# Patient Record
Sex: MALE | Race: WHITE | NOT HISPANIC OR LATINO | Employment: UNEMPLOYED | URBAN - NONMETROPOLITAN AREA
[De-identification: names, ages, dates, MRNs, and addresses within clinical notes are randomized per-mention and may not be internally consistent; named-entity substitution may affect disease eponyms.]

---

## 2017-01-05 ENCOUNTER — HOSPITAL ENCOUNTER (INPATIENT)
Facility: HOSPITAL | Age: 52
LOS: 4 days | Discharge: HOME | End: 2017-01-09
Attending: PSYCHIATRY & NEUROLOGY | Admitting: PSYCHIATRY & NEUROLOGY

## 2017-01-05 DIAGNOSIS — F33.2 SEVERE EPISODE OF RECURRENT MAJOR DEPRESSIVE DISORDER, WITHOUT PSYCHOTIC FEATURES (HCC): Primary | ICD-10-CM

## 2017-01-05 PROBLEM — F32.9 MDD (MAJOR DEPRESSIVE DISORDER): Status: ACTIVE | Noted: 2017-01-05

## 2017-01-05 LAB — PHENYTOIN SERPL-MCNC: 4.9 MCG/ML (ref 10–20)

## 2017-01-05 PROCEDURE — 80185 ASSAY OF PHENYTOIN TOTAL: CPT | Performed by: PSYCHIATRY & NEUROLOGY

## 2017-01-05 RX ORDER — BENZONATATE 100 MG/1
100 CAPSULE ORAL 3 TIMES DAILY PRN
Status: DISCONTINUED | OUTPATIENT
Start: 2017-01-05 | End: 2017-01-09 | Stop reason: HOSPADM

## 2017-01-05 RX ORDER — ACETAMINOPHEN 325 MG/1
650 TABLET ORAL EVERY 6 HOURS PRN
Status: DISCONTINUED | OUTPATIENT
Start: 2017-01-05 | End: 2017-01-09 | Stop reason: HOSPADM

## 2017-01-05 RX ORDER — MAGNESIUM HYDROXIDE/ALUMINUM HYDROXICE/SIMETHICONE 120; 1200; 1200 MG/30ML; MG/30ML; MG/30ML
30 SUSPENSION ORAL EVERY 6 HOURS PRN
Status: DISCONTINUED | OUTPATIENT
Start: 2017-01-05 | End: 2017-01-09 | Stop reason: HOSPADM

## 2017-01-05 RX ORDER — HYDROXYZINE 50 MG/1
50 TABLET, FILM COATED ORAL EVERY 6 HOURS PRN
Status: DISCONTINUED | OUTPATIENT
Start: 2017-01-05 | End: 2017-01-09 | Stop reason: HOSPADM

## 2017-01-05 RX ORDER — ECHINACEA PURPUREA EXTRACT 125 MG
2 TABLET ORAL AS NEEDED
Status: DISCONTINUED | OUTPATIENT
Start: 2017-01-05 | End: 2017-01-09 | Stop reason: HOSPADM

## 2017-01-05 RX ORDER — BENZTROPINE MESYLATE 1 MG/1
2 TABLET ORAL AS NEEDED
Status: DISCONTINUED | OUTPATIENT
Start: 2017-01-05 | End: 2017-01-09 | Stop reason: HOSPADM

## 2017-01-05 RX ORDER — TRAZODONE HYDROCHLORIDE 50 MG/1
100 TABLET ORAL NIGHTLY PRN
Status: DISCONTINUED | OUTPATIENT
Start: 2017-01-05 | End: 2017-01-09 | Stop reason: HOSPADM

## 2017-01-05 RX ORDER — BENZTROPINE MESYLATE 1 MG/ML
1 INJECTION INTRAMUSCULAR; INTRAVENOUS AS NEEDED
Status: DISCONTINUED | OUTPATIENT
Start: 2017-01-05 | End: 2017-01-09 | Stop reason: HOSPADM

## 2017-01-05 RX ORDER — IBUPROFEN 400 MG/1
400 TABLET ORAL EVERY 6 HOURS PRN
Status: DISCONTINUED | OUTPATIENT
Start: 2017-01-05 | End: 2017-01-09 | Stop reason: HOSPADM

## 2017-01-05 RX ORDER — NICOTINE 21 MG/24HR
1 PATCH, TRANSDERMAL 24 HOURS TRANSDERMAL
Status: DISCONTINUED | OUTPATIENT
Start: 2017-01-05 | End: 2017-01-09 | Stop reason: HOSPADM

## 2017-01-05 RX ADMIN — NICOTINE 1 PATCH: 21 PATCH TRANSDERMAL at 21:03

## 2017-01-05 NOTE — IP AVS SNAPSHOT
AFTER VISIT SUMMARY             [facility]           About your hospitalization     You were admitted on:  January 5, 2017 You last received care in the:  [facility] ADULT PSYCHIATRIC 2       Procedures & Surgeries         Medications    If you or your caregiver advised us that you are currently taking a medication and that medication is marked below as “Resume”, this simply indicates that we have reviewed those medications to make sure our new therapy recommendations do not interfere.  If you have concerns about medications other than those new ones which we are prescribing today, please consult the physician who prescribed them (or your primary physician).  Our review of your home medications is not meant to indicate that we are directing their use.             Your Medications      START taking these medications     busPIRone 10 MG tablet   Take 1 tablet by mouth 3 (Three) Times a Day.   Last time this was given:  1/9/2017  8:05 AM   Commonly known as:  BUSPAR   Next Dose Due:  01/09/2017 @ 4PM           hydrOXYzine 50 MG tablet   Take 1 tablet by mouth Every 6 (Six) Hours As Needed for anxiety.   Last time this was given:  1/8/2017  4:55 PM   Commonly known as:  ATARAX   Next Dose Due:  NEXT DOSE MAY BE GIVEN WHENEVER NEEDED, HAS BEEN 6 HOURS SINCE LAST DOSE.    Notes to Patient:  MAY TAKE A DOSE EVERY 6 HOURS ONLY IF NEEDED.           phenytoin 100 MG ER capsule   Take 1 capsule by mouth 3 (Three) Times a Day.   Commonly known as:  DILANTIN   Next Dose Due:  01/09/2017 @ 2PM           sertraline 50 MG tablet   Take 1 tablet by mouth Every Night.   Last time this was given:  1/8/2017  8:51 PM   Commonly known as:  ZOLOFT   Next Dose Due:  01/09/2017 @ 9PM, BEDTIME                Where to Get Your Medications      These [facility] [place] [address]    Hours:  8AM-6PM Mon-Fri Phone:  [phone]     busPIRone 10 MG tablet    hydrOXYzine 50  MG tablet    phenytoin 100 MG ER capsule    sertraline 50 MG tablet                  Your Medications      Your Medication List           Morning Noon Evening Bedtime As Needed    busPIRone 10 MG tablet   Take 1 tablet by mouth 3 (Three) Times a Day.   Commonly known as:  BUSPAR            0900           4PM       9PM           hydrOXYzine 50 MG tablet   Take 1 tablet by mouth Every 6 (Six) Hours As Needed for anxiety.   Commonly known as:  ATARAX   Notes to Patient:  MAY TAKE A DOSE EVERY 6 HOURS ONLY IF NEEDED.                                phenytoin 100 MG ER capsule   Take 1 capsule by mouth 3 (Three) Times a Day.   Commonly known as:  DILANTIN            0600           2PM       10PM           sertraline 50 MG tablet   Take 1 tablet by mouth Every Night.   Commonly known as:  ZOLOFT                                            Instructions for After Discharge        Activity Instructions     Activity as Tolerated                 Diet Instructions     Advance Diet As Tolerated                 Discharge References/Attachments     MAJOR DEPRESSIVE DISORDER (ENGLISH)    BUSPIRONE TABLETS (ENGLISH)    HYDROXYZINE CAPSULES OR TABLETS (ENGLISH)    PHENYTOIN CAPSULES AND EXTENDED-RELEASE CAPSULES (ENGLISH)    SERTRALINE TABLETS (ENGLISH)       Follow-ups for After Discharge        Referrals and Follow-ups to [facility]     Patient has declined outpatient follow up to be scheduled locally. Patient will be transported to [facility] via [facility] and has been encouraged by Dr. Gutierrez and Therapist to seek outpatient treatment upon arriving in [place]. Please follow these recommendations to seek outpatient follow up once established in [place].            [facility] Signup     Our records indicate that you have declined [facility] signup. If you would like to sign up for [employer], please email [email or web address] or call [phone] to obtain an activation code.         Summary of Your  Hospitalization        Reason for Hospitalization     Your primary diagnosis was:  Mood Problem      Care Providers     Provider Service Role Specialty    Peña Gutierrez MD Psychiatry Attending Provider Psychiatry      Your Allergies  Date Reviewed: 1/5/2017    No active allergies      Patient Belongings Returned     Document Return of Belongings Flowsheet     Were the patient bedside belongings sent home?   Yes   Belongings Retrieved from Security & Sent Home   Yes    Belongings Sent to Safe   --   Medications Retrieved from Pharmacy & Sent Home   -- (NO MEDS IN PHARMACY SAFE PER CLIENT REPORT AND STAFF DOCUMENTATION.)              More Information      Major Depressive Disorder  Major depressive disorder is a mental illness. It also may be called clinical depression or unipolar depression. Major depressive disorder usually causes feelings of sadness, hopelessness, or helplessness. Some people with this disorder do not feel particularly sad but lose interest in doing things they used to enjoy (anhedonia). Major depressive disorder also can cause physical symptoms. It can interfere with work, school, relationships, and other normal everyday activities. The disorder varies in severity but is longer lasting and more serious than the sadness we all feel from time to time in our lives.  Major depressive disorder often is triggered by stressful life events or major life changes. Examples of these triggers include divorce, loss of your job or home, a move, and the death of a family member or close friend. Sometimes this disorder occurs for no obvious reason at all. People who have family members with major depressive disorder or bipolar disorder are at higher risk for developing this disorder, with or without life stressors. Major depressive disorder can occur at any age. It may occur just once in your life (single episode major depressive disorder). It may occur multiple times (recurrent major depressive  disorder).  SYMPTOMS  People with major depressive disorder have either anhedonia or depressed mood on nearly a daily basis for at least 2 weeks or longer. Symptoms of depressed mood include:  · Feelings of sadness (blue or down in the dumps) or emptiness.  · Feelings of hopelessness or helplessness.  · Tearfulness or episodes of crying (may be observed by others).  · Irritability (children and adolescents).  In addition to depressed mood or anhedonia or both, people with this disorder have at least four of the following symptoms:  · Difficulty sleeping or sleeping too much.    · Significant change (increase or decrease) in appetite or weight.    · Lack of energy or motivation.  · Feelings of guilt and worthlessness.    · Difficulty concentrating, remembering, or making decisions.  · Unusually slow movement (psychomotor retardation) or restlessness (as observed by others).    · Recurrent wishes for death, recurrent thoughts of self-harm (suicide), or a suicide attempt.  People with major depressive disorder commonly have persistent negative thoughts about themselves, other people, and the world. People with severe major depressive disorder may experience distorted beliefs or perceptions about the world (psychotic delusions). They also may see or hear things that are not real (psychotic hallucinations).  DIAGNOSIS  Major depressive disorder is diagnosed through an assessment by your health care provider. Your health care provider will ask about aspects of your daily life, such as mood, sleep, and appetite, to see if you have the diagnostic symptoms of major depressive disorder. Your health care provider may ask about your medical history and use of alcohol or drugs, including prescription medicines. Your health care provider also may do a physical exam and blood work. This is because certain medical conditions and the use of certain substances can cause major depressive disorder-like symptoms (secondary depression).  Your health care provider also may refer you to a mental health specialist for further evaluation and treatment.  TREATMENT  It is important to recognize the symptoms of major depressive disorder and seek treatment. The following treatments can be prescribed for this disorder:    · Medicine. Antidepressant medicines usually are prescribed. Antidepressant medicines are thought to correct chemical imbalances in the brain that are commonly associated with major depressive disorder. Other types of medicine may be added if the symptoms do not respond to antidepressant medicines alone or if psychotic delusions or hallucinations occur.  · Talk therapy. Talk therapy can be helpful in treating major depressive disorder by providing support, education, and guidance. Certain types of talk therapy also can help with negative thinking (cognitive behavioral therapy) and with relationship issues that trigger this disorder (interpersonal therapy).  A mental health specialist can help determine which treatment is best for you. Most people with major depressive disorder do well with a combination of medicine and talk therapy. Treatments involving electrical stimulation of the brain can be used in situations with extremely severe symptoms or when medicine and talk therapy do not work over time. These treatments include electroconvulsive therapy, transcranial magnetic stimulation, and vagal nerve stimulation.     This information is not intended to replace advice given to you by your health care provider. Make sure you discuss any questions you have with your health care provider.     Document Released: 04/14/2014 Document Revised: 01/08/2016 Document Reviewed: 04/14/2014  [employer] Interactive Patient Education ©2016 [employer]          Buspirone tablets  What is this medicine?  BUSPIRONE (byoo RIGO bright) is used to treat anxiety disorders.  This medicine may be used for other purposes; ask your health care provider or pharmacist if  you have questions.  What should I tell my health care provider before I take this medicine?  They need to know if you have any of these conditions:  -kidney or liver disease  -an unusual or allergic reaction to buspirone, other medicines, foods, dyes, or preservatives  -pregnant or trying to get pregnant  -breast-feeding  How should I use this medicine?  Take this medicine by mouth with a glass of water. Follow the directions on the prescription label. You may take this medicine with or without food. To ensure that this medicine always works the same way for you, you should take it either always with or always without food. Take your doses at regular intervals. Do not take your medicine more often than directed. Do not stop taking except on the advice of your doctor or health care professional.  Talk to your pediatrician regarding the use of this medicine in children. Special care may be needed.  Overdosage: If you think you have taken too much of this medicine contact a poison control center or emergency room at once.  NOTE: This medicine is only for you. Do not share this medicine with others.  What if I miss a dose?  If you miss a dose, take it as soon as you can. If it is almost time for your next dose, take only that dose. Do not take double or extra doses.  What may interact with this medicine?  Do not take this medicine with any of the following medications:  -linezolid  -MAOIs like Carbex, Eldepryl, Marplan, Nardil, and Parnate  -methylene blue  -procarbazine  This medicine may also interact with the following medications:  -diazepam  -digoxin  -diltiazem  -erythromycin  -grapefruit juice  -haloperidol  -medicines for mental depression or mood problems  -medicines for seizures like carbamazepine, phenobarbital and phenytoin  -nefazodone  -other medications for anxiety  -rifampin  -ritonavir  -some antifungal medicines like itraconazole, ketoconazole, and voriconazole  -verapamil  -warfarin  This list may  not describe all possible interactions. Give your health care provider a list of all the medicines, herbs, non-prescription drugs, or dietary supplements you use. Also tell them if you smoke, drink alcohol, or use illegal drugs. Some items may interact with your medicine.  What should I watch for while using this medicine?  Visit your doctor or health care professional for regular checks on your progress. It may take 1 to 2 weeks before your anxiety gets better.  You may get drowsy or dizzy. Do not drive, use machinery, or do anything that needs mental alertness until you know how this drug affects you. Do not stand or sit up quickly, especially if you are an older patient. This reduces the risk of dizzy or fainting spells. Alcohol can make you more drowsy and dizzy. Avoid alcoholic drinks.  What side effects may I notice from receiving this medicine?  Side effects that you should report to your doctor or health care professional as soon as possible:  -blurred vision or other vision changes  -chest pain  -confusion  -difficulty breathing  -feelings of hostility or anger  -muscle aches and pains  -numbness or tingling in hands or feet  -ringing in the ears  -skin rash and itching  -vomiting  -weakness  Side effects that usually do not require medical attention (report to your doctor or health care professional if they continue or are bothersome):  -disturbed dreams, nightmares  -headache  -nausea  -restlessness or nervousness  -sore throat and nasal congestion  -stomach upset  This list may not describe all possible side effects. Call your doctor for medical advice about side effects. You may report side effects to FDA at 8-695-FDA-5910.  Where should I keep my medicine?  Keep out of the reach of children.  Store at room temperature below 30 degrees C (86 degrees F). Protect from light. Keep container tightly closed. Throw away any unused medicine after the expiration date.  NOTE: This sheet is a summary. It may not  cover all possible information. If you have questions about this medicine, talk to your doctor, pharmacist, or health care provider.     © 2016, Elsevier/Gold Standard. (2011-07-28 18:06:11)          Hydroxyzine capsules or tablets  What is this medicine?  HYDROXYZINE (lisa DROX i zeen) is an antihistamine. This medicine is used to treat allergy symptoms. It is also used to treat anxiety and tension. This medicine can be used with other medicines to induce sleep before surgery.  This medicine may be used for other purposes; ask your health care provider or pharmacist if you have questions.  What should I tell my health care provider before I take this medicine?  They need to know if you have any of these conditions:  -any chronic illness  -difficulty passing urine  -glaucoma  -heart disease  -kidney disease  -liver disease  -lung disease  -an unusual or allergic reaction to hydroxyzine, cetirizine, other medicines, foods, dyes, or preservatives  -pregnant or trying to get pregnant  -breast-feeding  How should I use this medicine?  Take this medicine by mouth with a full glass of water. Follow the directions on the prescription label. You may take this medicine with food or on an empty stomach. Take your medicine at regular intervals. Do not take your medicine more often than directed.  Talk to your pediatrician regarding the use of this medicine in children. Special care may be needed. While this drug may be prescribed for children as young as 6 years of age for selected conditions, precautions do apply.  Patients over 65 years old may have a stronger reaction and need a smaller dose.  Overdosage: If you think you have taken too much of this medicine contact a poison control center or emergency room at once.  NOTE: This medicine is only for you. Do not share this medicine with others.  What if I miss a dose?  If you miss a dose, take it as soon as you can. If it is almost time for your next dose, take only that dose.  Do not take double or extra doses.  What may interact with this medicine?  -alcohol  -barbiturate medicines for sleep or seizures  -medicines for colds, allergies  -medicines for depression, anxiety, or emotional disturbances  -medicines for pain  -medicines for sleep  -muscle relaxants  This list may not describe all possible interactions. Give your health care provider a list of all the medicines, herbs, non-prescription drugs, or dietary supplements you use. Also tell them if you smoke, drink alcohol, or use illegal drugs. Some items may interact with your medicine.  What should I watch for while using this medicine?  Tell your doctor or health care professional if your symptoms do not improve.  You may get drowsy or dizzy. Do not drive, use machinery, or do anything that needs mental alertness until you know how this medicine affects you. Do not stand or sit up quickly, especially if you are an older patient. This reduces the risk of dizzy or fainting spells. Alcohol may interfere with the effect of this medicine. Avoid alcoholic drinks.  Your mouth may get dry. Chewing sugarless gum or sucking hard candy, and drinking plenty of water may help. Contact your doctor if the problem does not go away or is severe.  This medicine may cause dry eyes and blurred vision. If you wear contact lenses you may feel some discomfort. Lubricating drops may help. See your eye doctor if the problem does not go away or is severe.  If you are receiving skin tests for allergies, tell your doctor you are using this medicine.  What side effects may I notice from receiving this medicine?  Side effects that you should report to your doctor or health care professional as soon as possible:  -fast or irregular heartbeat  -difficulty passing urine  -seizures  -slurred speech or confusion  -tremor  Side effects that usually do not require medical attention (report to your doctor or health care professional if they continue or are  bothersome):  -constipation  -drowsiness  -fatigue  -headache  -stomach upset  This list may not describe all possible side effects. Call your doctor for medical advice about side effects. You may report side effects to FDA at 6-544-FDA-5123.  Where should I keep my medicine?  Keep out of the reach of children.  Store at room temperature between 15 and 30 degrees C (59 and 86 degrees F). Keep container tightly closed. Throw away any unused medicine after the expiration date.  NOTE: This sheet is a summary. It may not cover all possible information. If you have questions about this medicine, talk to your doctor, pharmacist, or health care provider.     © 2016, Elsevier/Gold Standard. (2009-05-01 14:50:59)          Phenytoin capsules and extended-release capsules  What is this medicine?  PHENYTOIN (FEN i toyn) is used to control seizures in certain types of epilepsy. It is also used to prevent seizures during or after surgery.  This medicine may be used for other purposes; ask your health care provider or pharmacist if you have questions.  What should I tell my health care provider before I take this medicine?  They need to know if you have any of these conditions:  -an alcohol abuse problem  - ancestry  -blood disorders or disease  -diabetes  -heart problems  -kidney disease  -liver disease  -porphyria  -receiving radiation therapy  -suicidal thoughts, plans, or attempt; a previous suicide attempt by you or a family member  -thyroid disease  -an unusual or allergic reaction to phenytoin, other medicines, foods, dyes, or preservatives  -pregnant or trying to get pregnant  -breast-feeding  How should I use this medicine?  Take this medicine by mouth with a glass of water. Follow the directions on the prescription label. If you are taking extended-release capsules, swallow them whole. Do not crush or chew. Take this medicine with food if it upsets your stomach. It may be best to take your medicine consistently with  or without food. Take your doses at regular intervals. Do not take your medicine more often than directed. Do not stop taking this medicine suddenly. This increases the risk of seizures. Your doctor will tell you how much medicine to take. If your doctor wants you to stop the medicine, the dose will be slowly lowered over time to avoid any side effects.  Talk to your pediatrician regarding the use of this medicine in children. Special care may be needed.  Overdosage: If you think you have taken too much of this medicine contact a poison control center or emergency room at once.  NOTE: This medicine is only for you. Do not share this medicine with others.  What if I miss a dose?  If you miss a dose, take it as soon as you can. If it is almost time for your next dose, take only that dose. Do not take double or extra doses.  What may interact with this medicine?  Do not take this medicine with any of the following medications:  -delavirdine  -ibrutinib  -ranolazine  This medicine may also interact with the following medications:  -albendazole  -alcohol  -antiviral medicines for HIV or AIDS  -aspirin and aspirin-like medicines  -certain medicines for blood pressure like nifedipine, nimodipine, and verapamil  -certain medicines for cancer  -certain medicines for cholesterol like atorvastatin, simvastatin, and fluvastatin  -certain medicines for depression, anxiety, or psychotic disturbances  -certain medicines for fungal infections like ketoconazole and itraconazole  -certain medicines for irregular heart beat like amiodarone and quinidine  -certain medicines for seizures like carbamazepine, phenobarbital, and topiramate  -certain medicines for stomach problems like cimetidine and omeprazole  -chloramphenicol  -cyclosporine  -diazoxide  -digoxin  -disulfiram  -doxycycline  -female hormones, like estrogens and birth control pills  -furosemide  -halothane  -isoniazid  -medicines that relax muscles for  surgery  -methylphenidate  -narcotic medicines for pain  -phenothiazines like chlorpromazine, mesoridazine, prochlorperazine, thioridazine  -praziquantel  -reserpine  -rifampin  -Roya's Wort  -steroid medicines like prednisone or cortisone  -sulfonamides like sulfamethoxazole or sulfasalazine  -supplements like folic acid or vitamin D  -theophylline  -ticlopidine  -tolbutamide  -warfarin  This list may not describe all possible interactions. Give your health care provider a list of all the medicines, herbs, non-prescription drugs, or dietary supplements you use. Also tell them if you smoke, drink alcohol, or use illegal drugs. Some items may interact with your medicine.  What should I watch for while using this medicine?  Visit your doctor or health care professional for regular checks on your progress. This medicine needs careful monitoring. Your doctor or health care professional may schedule regular blood tests.  Wear a medical ID bracelet or chain, and carry a card that describes your disease and details of your medicine and dosage times.  Do not change brands or dosage forms of this medicine without discussing the change with your doctor or health care professional.  You may get drowsy or dizzy. Do not drive, use machinery, or do anything that needs mental alertness until you know how this medicine affects you. Do not stand or sit up quickly, especially if you are an older patient. This reduces the risk of dizzy or fainting spells. Alcohol may interfere with the effect of this medicine. Avoid alcoholic drinks.  Birth control pills may not work properly while you are taking this medicine. Talk to your doctor about using an extra method of birth control.  This medicine can cause unusual growth of gum tissues. Visit your dentist regularly. Problems can arise if you need dental work, and in the day to day care of your teeth. Try to avoid damage to your teeth and gums when you brush or floss your teeth.  Do not  take antacids at the same time as this medicine. If you get an upset stomach and want to take an antacid or medicine for diarrhea, make sure there is an interval of 2 to 3 hours before or after you took your phenytoin.  The use of this medicine may increase the chance of suicidal thoughts or actions. Pay special attention to how you are responding while on this medicine. Any worsening of mood, or thoughts of suicide or dying should be reported to your health care professional right away.  Women who become pregnant while using this medicine may enroll in the [place] Antiepileptic Drug Pregnancy Registry by calling [phone]. This registry collects information about the safety of antiepileptic drug use during pregnancy.  What side effects may I notice from receiving this medicine?  Side effects that you should report to your doctor or health care professional as soon as possible:  -allergic reactions like skin rash, itching or hives, swelling of the face, lips, or tongue  -breathing problems  -changes in vision  -chest pain or tightness  -confusion  -dark yellow or brown urine  -fast or irregular heartbeat  -fever, sore throat  -headache  -loss of seizure control  -poor control of body movements or difficulty walking  -redness, blistering, peeling or loosening of the skin, including inside the mouth  -unusual bleeding or bruising, pinpoint red spots on skin  -vomiting  -worsening of mood, thoughts or actions of suicide or dying  -yellowing of the eyes or skin  Side effects that usually do not require medical attention (report to your doctor or health care professional if they continue or are bothersome):  -constipation  -difficulty sleeping  -excessive hair growth on the face or body  -nausea  This list may not describe all possible side effects. Call your doctor for medical advice about side effects. You may report side effects to FDA at 1-903-FDA-4769.  Where should I keep my medicine?  Keep out of the  reach of children.  Store at room temperature between 15 and 30 degrees C (59 and 86 degrees F). Protect from light and moisture. Throw away any unused medicine after the expiration date.  NOTE: This sheet is a summary. It may not cover all possible information. If you have questions about this medicine, talk to your doctor, pharmacist, or health care provider.     © 2016, [employer]/Gold Standard. (2014-07-14 15:07:05)          Sertraline tablets  What is this medicine?  SERTRALINE (SER tra beny) is used to treat depression. It may also be used to treat obsessive compulsive disorder, panic disorder, post-trauma stress, premenstrual dysphoric disorder (PMDD) or social anxiety.  This medicine may be used for other purposes; ask your health care provider or pharmacist if you have questions.  What should I tell my health care provider before I take this medicine?  They need to know if you have any of these conditions:  -bipolar disorder or a family history of bipolar disorder  -diabetes  -glaucoma  -heart disease  -high blood pressure  -history of irregular heartbeat  -history of low levels of calcium, magnesium, or potassium in the blood  -if you often drink alcohol  -liver disease  -receiving electroconvulsive therapy  -seizures  -suicidal thoughts, plans, or attempt; a previous suicide attempt by you or a family member  -thyroid disease  -an unusual or allergic reaction to sertraline, other medicines, foods, dyes, or preservatives  -pregnant or trying to get pregnant  -breast-feeding  How should I use this medicine?  Take this medicine by mouth with a glass of water. Follow the directions on the prescription label. You can take it with or without food. Take your medicine at regular intervals. Do not take your medicine more often than directed. Do not stop taking this medicine suddenly except upon the advice of your doctor. Stopping this medicine too quickly may cause serious side effects or your condition may  worsen.  A special MedGuide will be given to you by the pharmacist with each prescription and refill. Be sure to read this information carefully each time.  Talk to your pediatrician regarding the use of this medicine in children. While this drug may be prescribed for children as young as 7 years for selected conditions, precautions do apply.  Overdosage: If you think you have taken too much of this medicine contact a poison control center or emergency room at once.  NOTE: This medicine is only for you. Do not share this medicine with others.  What if I miss a dose?  If you miss a dose, take it as soon as you can. If it is almost time for your next dose, take only that dose. Do not take double or extra doses.  What may interact with this medicine?  Do not take this medicine with any of the following medications:  -certain medicines for fungal infections like fluconazole, itraconazole, ketoconazole, posaconazole, voriconazole  -cisapride  -disulfiram  -dofetilide  -linezolid  -MAOIs like Carbex, Eldepryl, Marplan, Nardil, and Parnate  -metronidazole  -methylene blue (injected into a vein)  -pimozide  -thioridazine  -ziprasidone  This medicine may also interact with the following medications:  -alcohol  -aspirin and aspirin-like medicines  -certain medicines for depression, anxiety, or psychotic disturbances  -certain medicines for irregular heart beat like flecainide, propafenone  -certain medicines for migraine headaches like almotriptan, eletriptan, frovatriptan, naratriptan, rizatriptan, sumatriptan, zolmitriptan  -certain medicines for sleep  -certain medicines for seizures like carbamazepine, valproic acid, phenytoin  -certain medicines that treat or prevent blood clots like warfarin, enoxaparin, dalteparin  -cimetidine  -digoxin  -diuretics  -fentanyl  -furazolidone  -isoniazid  -lithium  -NSAIDs, medicines for pain and inflammation, like ibuprofen or naproxen  -other medicines that prolong the QT interval  (cause an abnormal heart rhythm)  -procarbazine  -rasagiline  -supplements like [place]'s wort, kava kava, valerian  -tolbutamide  -tramadol  -tryptophan  This list may not describe all possible interactions. Give your health care provider a list of all the medicines, herbs, non-prescription drugs, or dietary supplements you use. Also tell them if you smoke, drink alcohol, or use illegal drugs. Some items may interact with your medicine.  What should I watch for while using this medicine?  Tell your doctor if your symptoms do not get better or if they get worse. Visit your doctor or health care professional for regular checks on your progress. Because it may take several weeks to see the full effects of this medicine, it is important to continue your treatment as prescribed by your doctor.  Patients and their families should watch out for new or worsening thoughts of suicide or depression. Also watch out for sudden changes in feelings such as feeling anxious, agitated, panicky, irritable, hostile, aggressive, impulsive, severely restless, overly excited and hyperactive, or not being able to sleep. If this happens, especially at the beginning of treatment or after a change in dose, call your health care professional.  You may get drowsy or dizzy. Do not drive, use machinery, or do anything that needs mental alertness until you know how this medicine affects you. Do not stand or sit up quickly, especially if you are an older patient. This reduces the risk of dizzy or fainting spells. Alcohol may interfere with the effect of this medicine. Avoid alcoholic drinks.  Your mouth may get dry. Chewing sugarless gum or sucking hard candy, and drinking plenty of water may help. Contact your doctor if the problem does not go away or is severe.  What side effects may I notice from receiving this medicine?  Side effects that you should report to your doctor or health care professional as soon as possible:  -allergic reactions  like skin rash, itching or hives, swelling of the face, lips, or tongue  -black or bloody stools, blood in the urine or vomit  -fast, irregular heartbeat  -feeling faint or lightheaded, falls  -hallucination, loss of contact with reality  -seizures  -suicidal thoughts or other mood changes  -unusual bleeding or bruising  -unusually weak or tired  -vomiting  Side effects that usually do not require medical attention (report to your doctor or health care professional if they continue or are bothersome):  -change in appetite  -change in sex drive or performance  -diarrhea  -increased sweating  -indigestion, nausea  -tremors  This list may not describe all possible side effects. Call your doctor for medical advice about side effects. You may report side effects to FDA at 6-806-FDA-7927.  Where should I keep my medicine?  Keep out of the reach of children.  Store at room temperature between 15 and 30 degrees C (59 and 86 degrees F). Throw away any unused medicine after the expiration date.  NOTE: This sheet is a summary. It may not cover all possible information. If you have questions about this medicine, talk to your doctor, pharmacist, or health care provider.     © 2016, Elsevier/Gold Standard. (2014-07-15 12:57:35)            SYMPTOMS OF A STROKE    Call 911 or have someone take you to the Emergency Department if you have any of the following:    · Sudden numbness or weakness of your face, arm or leg especially on one side of the body  · Sudden confusion, diffiiculty speaking or trouble understanding   · Changes in your vision or loss of sight in one eye  · Sudden severe headache with no known cause  · sudden dizziness, trouble walking, loss of balance or coordination    It is important to seek emergency care right away if you have further stroke symptoms. If you get emergency help quickly, the powerful clot-dissolving medicines can reduce the disabilities caused by a stroke.     For more information:    American  Stroke Association  9-410-0-STROKE  www.strokeassociation.org           IF YOU SMOKE OR USE TOBACCO PLEASE READ THE FOLLOWING:    Why is smoking bad for me?  Smoking increases the risk of heart disease, lung disease, vascular disease, stroke, and cancer.     If you smoke, STOP!    If you would like more information on quitting smoking, please visit the [employer] website: [email or web address]   This link will provide additional resources including the QUIT line and the [employer] the [employer] support groups.     For more information:    American Cancer Society  [phone]    American Heart Association  1-994.610.9202               YOU ARE THE MOST IMPORTANT FACTOR IN YOUR RECOVERY.     Follow all instructions carefully.     I have reviewed my discharge instructions with my nurse, including the following information, if applicable:     Information about my illness and diagnosis   Follow up appointments (including lab draws)   Wound Care   Equipment Needs   Medications (new and continuing) along with side effects   Preventative information such as vaccines and smoking cessations   Diet   Pain   I know when to contact my Doctor's office or seek emergency care      I want my nurse to describe the side effects of my medications: YES NO   If the answer is no, I understand the side effects of my medications: YES NO   My nurse described the side effects of my medications in a way that I could understand: YES NO   I have taken my personal belongings and my own medications with me at discharge: YES NO            I have received this information and my questions have been answered. I have discussed any concerns I see with this plan with the nurse or physician. I understand these instructions.    Signature of Patient or Responsible Person: _____________________________________    Date: _________________  Time: __________________    Signature of Healthcare Provider:  _______________________________________  Date: _________________  Time: __________________

## 2017-01-06 PROBLEM — F33.2 SEVERE RECURRENT MAJOR DEPRESSION WITHOUT PSYCHOTIC FEATURES (HCC): Status: ACTIVE | Noted: 2017-01-05

## 2017-01-06 LAB
ALBUMIN SERPL-MCNC: 4.2 G/DL (ref 3.5–5)
ALBUMIN/GLOB SERPL: 1.2 G/DL (ref 1.5–2.5)
ALP SERPL-CCNC: 80 U/L (ref 46–116)
ALT SERPL W P-5'-P-CCNC: 24 U/L (ref 10–44)
AMPHET+METHAMPHET UR QL: NEGATIVE
ANION GAP SERPL CALCULATED.3IONS-SCNC: 6 MMOL/L (ref 3.6–11.2)
AST SERPL-CCNC: 35 U/L (ref 10–34)
BARBITURATES UR QL SCN: NEGATIVE
BASOPHILS # BLD AUTO: 0.03 10*3/MM3 (ref 0–0.3)
BASOPHILS NFR BLD AUTO: 0.3 % (ref 0–2)
BENZODIAZ UR QL SCN: NEGATIVE
BILIRUB SERPL-MCNC: 0.3 MG/DL (ref 0.2–1.8)
BUN BLD-MCNC: 11 MG/DL (ref 7–21)
BUN/CREAT SERPL: 12.4 (ref 7–25)
CALCIUM SPEC-SCNC: 9.2 MG/DL (ref 7.7–10)
CANNABINOIDS SERPL QL: POSITIVE
CHLORIDE SERPL-SCNC: 108 MMOL/L (ref 99–112)
CO2 SERPL-SCNC: 26 MMOL/L (ref 24.3–31.9)
COCAINE UR QL: NEGATIVE
CREAT BLD-MCNC: 0.89 MG/DL (ref 0.43–1.29)
DEPRECATED RDW RBC AUTO: 44.6 FL (ref 37–54)
EOSINOPHIL # BLD AUTO: 0.12 10*3/MM3 (ref 0–0.7)
EOSINOPHIL NFR BLD AUTO: 1.2 % (ref 0–5)
ERYTHROCYTE [DISTWIDTH] IN BLOOD BY AUTOMATED COUNT: 13.4 % (ref 11.5–14.5)
GFR SERPL CREATININE-BSD FRML MDRD: 90 ML/MIN/1.73
GLOBULIN UR ELPH-MCNC: 3.4 GM/DL
GLUCOSE BLD-MCNC: 93 MG/DL (ref 70–110)
HCT VFR BLD AUTO: 42.2 % (ref 42–52)
HGB BLD-MCNC: 13.5 G/DL (ref 14–18)
IMM GRANULOCYTES # BLD: 0.04 10*3/MM3 (ref 0–0.03)
IMM GRANULOCYTES NFR BLD: 0.4 % (ref 0–0.5)
LYMPHOCYTES # BLD AUTO: 2.36 10*3/MM3 (ref 1–3)
LYMPHOCYTES NFR BLD AUTO: 24 % (ref 21–51)
MCH RBC QN AUTO: 30.3 PG (ref 27–33)
MCHC RBC AUTO-ENTMCNC: 32 G/DL (ref 33–37)
MCV RBC AUTO: 94.8 FL (ref 80–94)
METHADONE UR QL SCN: NEGATIVE
MONOCYTES # BLD AUTO: 1.15 10*3/MM3 (ref 0.1–0.9)
MONOCYTES NFR BLD AUTO: 11.7 % (ref 0–10)
NEUTROPHILS # BLD AUTO: 6.12 10*3/MM3 (ref 1.4–6.5)
NEUTROPHILS NFR BLD AUTO: 62.4 % (ref 30–70)
OPIATES UR QL: NEGATIVE
OSMOLALITY SERPL CALC.SUM OF ELEC: 278.5 MOSM/KG (ref 273–305)
OXYCODONE UR QL SCN: NEGATIVE
PCP UR QL SCN: NEGATIVE
PLATELET # BLD AUTO: 223 10*3/MM3 (ref 130–400)
PMV BLD AUTO: 11.1 FL (ref 6–10)
POTASSIUM BLD-SCNC: 4.1 MMOL/L (ref 3.5–5.3)
PROPOXYPH UR QL: NEGATIVE
PROT SERPL-MCNC: 7.6 G/DL (ref 6–8)
RBC # BLD AUTO: 4.45 10*6/MM3 (ref 4.7–6.1)
SODIUM BLD-SCNC: 140 MMOL/L (ref 135–153)
WBC NRBC COR # BLD: 9.82 10*3/MM3 (ref 4.5–12.5)

## 2017-01-06 PROCEDURE — 80307 DRUG TEST PRSMV CHEM ANLYZR: CPT

## 2017-01-06 PROCEDURE — 85025 COMPLETE CBC W/AUTO DIFF WBC: CPT | Performed by: PSYCHIATRY & NEUROLOGY

## 2017-01-06 PROCEDURE — 99223 1ST HOSP IP/OBS HIGH 75: CPT

## 2017-01-06 PROCEDURE — 87086 URINE CULTURE/COLONY COUNT: CPT

## 2017-01-06 PROCEDURE — G0477 DRUG TEST PRESUMP OPTICAL: HCPCS

## 2017-01-06 PROCEDURE — 80053 COMPREHEN METABOLIC PANEL: CPT | Performed by: PSYCHIATRY & NEUROLOGY

## 2017-01-06 RX ORDER — PHENYTOIN 50 MG/1
100 TABLET, CHEWABLE ORAL EVERY 8 HOURS SCHEDULED
Status: DISCONTINUED | OUTPATIENT
Start: 2017-01-06 | End: 2017-01-09 | Stop reason: HOSPADM

## 2017-01-06 RX ORDER — BUSPIRONE HYDROCHLORIDE 10 MG/1
10 TABLET ORAL 3 TIMES DAILY
Status: DISCONTINUED | OUTPATIENT
Start: 2017-01-06 | End: 2017-01-09 | Stop reason: HOSPADM

## 2017-01-06 RX ADMIN — PHENYTOIN 100 MG: 50 TABLET, CHEWABLE ORAL at 14:54

## 2017-01-06 RX ADMIN — SERTRALINE HYDROCHLORIDE 50 MG: 50 TABLET, FILM COATED ORAL at 20:49

## 2017-01-06 RX ADMIN — BUSPIRONE HYDROCHLORIDE 10 MG: 10 TABLET ORAL at 20:49

## 2017-01-06 RX ADMIN — BUSPIRONE HYDROCHLORIDE 10 MG: 10 TABLET ORAL at 16:12

## 2017-01-06 RX ADMIN — PHENYTOIN 100 MG: 50 TABLET, CHEWABLE ORAL at 21:08

## 2017-01-06 NOTE — PLAN OF CARE
Problem: BH Patient Care Overview (Adult)  Goal: Plan of Care Review  Outcome: Ongoing (interventions implemented as appropriate)    01/06/17 0222   Coping/Psychosocial Response Interventions   Plan Of Care Reviewed With patient   Coping/Psychosocial   Patient Agreement with Plan of Care agrees   Patient Care Overview   Progress no change   Outcome Evaluation   Outcome Summary/Follow up Plan New patient @ 1855 for MDD         Problem:  Overarching Goals  Goal: Adheres to Safety Considerations for Self and Others  Outcome: Ongoing (interventions implemented as appropriate)  Goal: Optimized Coping Skills in Response to Life Stressors  Outcome: Ongoing (interventions implemented as appropriate)  Goal: Develops/Participates in Therapeutic [place] to Support Successful Transition  Outcome: Ongoing (interventions implemented as appropriate)

## 2017-01-06 NOTE — PLAN OF CARE
Problem: BH Patient Care Overview (Adult)  Goal: Individualization and Mutuality  Outcome: Ongoing (interventions implemented as appropriate)    01/06/17 1004   Behavioral Health Screens   Patient Personal Strengths resilient;motivated for recovery;motivated for treatment   Patient Personal Strengths Comment Desires to feel better-Has future plans   Patient Vulnerabilities ineffective coping skills; poor insight and judgement   Individualization   Patient Specific Goals Patient will identify 2 healthy coping skills prior to discharge   Patient Specific Interventions Patient's medications will be evaluated. Will assist with aftercare. Will assist with community resources such as housing.         Patient will be offered 1-4 individual sessions 20-30 minutes in length; daily groups. Patient will be encouraged to involve his family in his treatment. Will assist with homeless shelter referral. Will coordinate aftercare with Dr Little.
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
## 2017-01-06 NOTE — PLAN OF CARE
Problem:  Patient Care Overview (Adult)  Goal: Plan of Care Review  Outcome: Ongoing (interventions implemented as appropriate)    01/06/17 1821   Coping/Psychosocial Response Interventions   Plan Of Care Reviewed With patient   Coping/Psychosocial   Patient Agreement with Plan of Care agrees   Patient Care Overview   Progress no change   Outcome Evaluation   Outcome Summary/Follow up Plan Pt has been calm and cooperative this shift. Pt rates anxiety and depression 6/10, and denies SI, HI, or YEE. Continue to monitor.         Problem:  Overarching Goals  Goal: Adheres to Safety Considerations for Self and Others  Outcome: Ongoing (interventions implemented as appropriate)  Goal: Optimized Coping Skills in Response to Life Stressors  Outcome: Ongoing (interventions implemented as appropriate)  Goal: Develops/Participates in Therapeutic [place] to Support Successful Transition  Outcome: Ongoing (interventions implemented as appropriate)

## 2017-01-06 NOTE — PROGRESS NOTES
navigator is helping primary therapist with discharge planning. Navigator contacted [facility] and they agreed to take patient on this day.
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
## 2017-01-07 PROCEDURE — 99231 SBSQ HOSP IP/OBS SF/LOW 25: CPT

## 2017-01-07 RX ADMIN — BUSPIRONE HYDROCHLORIDE 10 MG: 10 TABLET ORAL at 15:08

## 2017-01-07 RX ADMIN — PHENYTOIN 100 MG: 50 TABLET, CHEWABLE ORAL at 06:08

## 2017-01-07 RX ADMIN — BUSPIRONE HYDROCHLORIDE 10 MG: 10 TABLET ORAL at 20:56

## 2017-01-07 RX ADMIN — SERTRALINE HYDROCHLORIDE 50 MG: 50 TABLET, FILM COATED ORAL at 20:56

## 2017-01-07 RX ADMIN — BUSPIRONE HYDROCHLORIDE 10 MG: 10 TABLET ORAL at 08:23

## 2017-01-07 RX ADMIN — NICOTINE 1 PATCH: 21 PATCH TRANSDERMAL at 08:23

## 2017-01-07 RX ADMIN — PHENYTOIN 100 MG: 50 TABLET, CHEWABLE ORAL at 15:08

## 2017-01-07 RX ADMIN — PHENYTOIN 100 MG: 50 TABLET, CHEWABLE ORAL at 21:01

## 2017-01-07 NOTE — PLAN OF CARE
Problem:  Patient Care Overview (Adult)  Goal: Plan of Care Review  Outcome: Ongoing (interventions implemented as appropriate)    01/07/17 4067   Coping/Psychosocial Response Interventions   Plan Of Care Reviewed With patient   Coping/Psychosocial   Patient Agreement with Plan of Care agrees   Patient Care Overview   Progress improving   Outcome Evaluation   Outcome Summary/Follow up Plan Patient has been calm and cooperative this shift. Pt rates anxiety and depression 4/10. Pt ivan SI, HI, or YEE. Pt possible discharge Monday. Continue to monitor.         Problem:  Overarching Goals  Goal: Adheres to Safety Considerations for Self and Others  Outcome: Ongoing (interventions implemented as appropriate)  Goal: Optimized Coping Skills in Response to Life Stressors  Outcome: Ongoing (interventions implemented as appropriate)  Goal: Develops/Participates in Therapeutic [place] to Support Successful Transition  Outcome: Ongoing (interventions implemented as appropriate)

## 2017-01-07 NOTE — PLAN OF CARE
Problem: BH Patient Care Overview (Adult)  Goal: Plan of Care Review  Outcome: Ongoing (interventions implemented as appropriate)    01/07/17 0558   Coping/Psychosocial Response Interventions   Plan Of Care Reviewed With patient   Coping/Psychosocial   Patient Agreement with Plan of Care agrees   Patient Care Overview   Progress improving   Outcome Evaluation   Outcome Summary/Follow up Plan Pt of room and interaction with peers/staff for pm shift on 1/6/17. Rates anxiety 6/10 and depression 6/10. Denies SI/HI and hallucinations. 1/7/17 0601

## 2017-01-07 NOTE — PROGRESS NOTES
Subjective   Grupo Mckenzie is a 51 y.o. male     Hospital Day #2    Chief Complaint:  depression    HPI:  History of Present Illness  Tolerated starting the Zoloft and BuSpar.  Says he is starting to feel a little better today. He is agreeable to go to [facility] shelter on Monday if that can be arranged.  Depression rating 6/10  Anxiety rating 6/10  Sleep: fair      Review of Systems   Constitutional: Negative.    HENT: Negative.    Respiratory: Negative.    Cardiovascular: Negative.    Gastrointestinal: Negative.    Musculoskeletal: Positive for myalgias.       Objective   Physical Exam   Constitutional: He appears well-developed and well-nourished. No distress.   Neurological: He is alert. Coordination and gait normal.   Vitals reviewed.      Wt Readings from Last 3 Encounters:   01/05/17 176 lb (79.8 kg)     Temp Readings from Last 3 Encounters:   01/07/17 97.3 °F (36.3 °C) (Tympanic)     BP Readings from Last 3 Encounters:   01/07/17 122/66     Pulse Readings from Last 3 Encounters:   01/07/17 83       No Known Allergies    Lab Results (last 24 hours)     Procedure Component Value Units Date/Time    Urine Culture [[id number]] Collected:  01/06/17 1546    Specimen:  Urine from Urine, Clean Catch Updated:  01/06/17 1610    Oxycodone, Urine [[id number]]  (Normal) Collected:  01/06/17 1546    Specimen:  Urine from Urine, Clean Catch Updated:  01/06/17 1617     Oxycodone Screen, Urine Negative     Narrative:       Oxycodone Screen Detects:    Oxycodone          100 ng/mL    Hydrocodone       1562 ng/mL    Codeine          18136 ng/mL    Dihydrocodeine   68647 ng/mL    Ethylmorphine    80492 ng/mL    Hydromorphone    30928 ng/mL    Oxymorphone      1562  ng/mL    Thebaine         94843 ng/mL    Urine Drug Screen [[id number]]  (Abnormal) Collected:  01/06/17 1546    Specimen:  Urine from Urine, Clean Catch Updated:  01/06/17 1625     Amphetamine Screen, Urine Negative      Barbiturates Screen, Urine Negative       "Benzodiazepine Screen, Urine Negative      Cocaine Screen, Urine Negative      Methadone Screen, Urine Negative      Opiate Screen Negative      Phencyclidine (PCP), Urine Negative      Propoxyphene Screen Negative      THC, Screen, Urine Positive (A)     Narrative:       Negative Thresholds For Drugs Screened:                  Amphetamines              1000 ng/ml               Barbiturates               200 ng/ml               Benzodiazepines            200 ng/ml              Cocaine                    300 ng/ml              Methadone                  300 ng/ml              Opiates                    300 ng/ml               Phencyclidine               25 ng/ml               Propoxyphene               300 ng/ml              THC                         50 ng/ml    The reference range for all drugs tested is negative. This report includes final unconfirmed qualitative results to be used for medical treatment purposes only. Unconfirmed results must not be used for non-medical purposes such as employment or legal testing. Clinical consideration should be applied to any drug of abuse test, especially when unconfirmed quantitative results are used.            Imaging Results (last 24 hours)     ** No results found for the last 24 hours. **          Current Medications:     busPIRone 10 mg Oral TID   nicotine 1 patch Transdermal Q24H   phenytoin 100 mg Oral Q8H   sertraline 50 mg Oral Nightly     •  acetaminophen  •  aluminum-magnesium hydroxide-simethicone  •  benzonatate  •  benztropine **OR** benztropine  •  hydrOXYzine  •  ibuprofen  •  magnesium hydroxide  •  sodium chloride  •  traZODone      Mental Status Exam:   Appearance: Neatly, casually dressed, good hygeine.   Cooperation: Cooperative  Orientation: Ox4  Gait and station stable.   Psychomotor: No psychomotor agitation/retardation, No EPS, No motor tics  Speech: normal rate, amount.  Mood \"a little better\"   Affect: congruent/appropriate.  Thought Content: goal " directed, no delusional material present  Thought process: linear, organized.  Suicidality: No SI  Homicidality: No HI  Perception: No AH/VH. No overt psychosis.   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  Will tentatively plan on discharge on Monday to the [facility] if this can be arranged.    We discussed risks, benefits, and side effects of the above medication and the patient was agreeable with the plan. I have reviewed the treatment plan and agree with current plan.  Treatment was discussed with the patient who is agreeable to this treatment and plan.

## 2017-01-08 PROCEDURE — 99231 SBSQ HOSP IP/OBS SF/LOW 25: CPT

## 2017-01-08 RX ADMIN — BUSPIRONE HYDROCHLORIDE 10 MG: 10 TABLET ORAL at 08:29

## 2017-01-08 RX ADMIN — PHENYTOIN 100 MG: 50 TABLET, CHEWABLE ORAL at 06:18

## 2017-01-08 RX ADMIN — HYDROXYZINE HYDROCHLORIDE 50 MG: 50 TABLET ORAL at 16:55

## 2017-01-08 RX ADMIN — BUSPIRONE HYDROCHLORIDE 10 MG: 10 TABLET ORAL at 14:31

## 2017-01-08 RX ADMIN — SERTRALINE HYDROCHLORIDE 50 MG: 50 TABLET, FILM COATED ORAL at 20:51

## 2017-01-08 RX ADMIN — NICOTINE 1 PATCH: 21 PATCH TRANSDERMAL at 08:29

## 2017-01-08 RX ADMIN — PHENYTOIN 100 MG: 50 TABLET, CHEWABLE ORAL at 14:26

## 2017-01-08 RX ADMIN — BUSPIRONE HYDROCHLORIDE 10 MG: 10 TABLET ORAL at 20:51

## 2017-01-08 RX ADMIN — PHENYTOIN 100 MG: 50 TABLET, CHEWABLE ORAL at 21:07

## 2017-01-08 NOTE — PROGRESS NOTES
"Subjective   Grupo Mckenzie is a 51 y.o. male     Hospital Day #3    Chief Complaint:  depression    HPI:  History of Present Illness  Tolerated starting the Zoloft and BuSpar.  Says he is starting to feel a little better today. He had at first said he was agreeable to go to [facility] on Monday if that can be arranged.  However, today he says [place] has decided [place]  He is asking for a ride to the nearest [employer] bus station at discharge.  Depression rating 4/10  Anxiety rating 4/10  Sleep: fair      Review of Systems   Constitutional: Negative.    HENT: Negative.    Respiratory: Negative.    Cardiovascular: Negative.    Gastrointestinal: Negative.    Musculoskeletal: Positive for myalgias.       Objective   Physical Exam   Constitutional: He appears well-developed and well-nourished. No distress.   Neurological: He is alert. Coordination and gait normal.   Vitals reviewed.      Wt Readings from Last 3 Encounters:   01/05/17 176 lb (79.8 kg)     Temp Readings from Last 3 Encounters:   01/08/17 97.2 °F (36.2 °C) (Temporal Artery )     BP Readings from Last 3 Encounters:   01/08/17 129/73     Pulse Readings from Last 3 Encounters:   01/08/17 74       No Known Allergies    Lab Results (last 24 hours)     Procedure Component Value Units Date/Time    Urine Culture [[id number]] Collected:  01/06/17 1546    Specimen:  Urine from Urine, Clean Catch Updated:  01/06/17 1610    Oxycodone, Urine [[id number]]  (Normal) Collected:  01/06/17 1546    Specimen:  Urine from Urine, Clean Catch Updated:  01/06/17 1617     Oxycodone Screen, Urine Negative     Narrative:       Oxycodone Screen Detects:    Oxycodone          100 ng/mL    Hydrocodone       1562 ng/mL    Codeine          96712 ng/mL    Dihydrocodeine   91497 ng/mL    Ethylmorphine    81337 ng/mL    Hydromorphone    62942 ng/mL    Oxymorphone      1562  ng/mL    Thebaine         66582 ng/mL    Urine Drug Screen [[id number]]  (Abnormal) " Collected:  01/06/17 1546    Specimen:  Urine from Urine, Clean Catch Updated:  01/06/17 1625     Amphetamine Screen, Urine Negative      Barbiturates Screen, Urine Negative      Benzodiazepine Screen, Urine Negative      Cocaine Screen, Urine Negative      Methadone Screen, Urine Negative      Opiate Screen Negative      Phencyclidine (PCP), Urine Negative      Propoxyphene Screen Negative      THC, Screen, Urine Positive (A)     Narrative:       Negative Thresholds For Drugs Screened:                  Amphetamines              1000 ng/ml               Barbiturates               200 ng/ml               Benzodiazepines            200 ng/ml              Cocaine                    300 ng/ml              Methadone                  300 ng/ml              Opiates                    300 ng/ml               Phencyclidine               25 ng/ml               Propoxyphene               300 ng/ml              THC                         50 ng/ml    The reference range for all drugs tested is negative. This report includes final unconfirmed qualitative results to be used for medical treatment purposes only. Unconfirmed results must not be used for non-medical purposes such as employment or legal testing. Clinical consideration should be applied to any drug of abuse test, especially when unconfirmed quantitative results are used.            Imaging Results (last 24 hours)     ** No results found for the last 24 hours. **          Current Medications:     busPIRone 10 mg Oral TID   nicotine 1 patch Transdermal Q24H   phenytoin 100 mg Oral Q8H   sertraline 50 mg Oral Nightly     •  acetaminophen  •  aluminum-magnesium hydroxide-simethicone  •  benzonatate  •  benztropine **OR** benztropine  •  hydrOXYzine  •  ibuprofen  •  magnesium hydroxide  •  sodium chloride  •  traZODone      Mental Status Exam:   Appearance: Neatly, casually dressed, good hygeine.   Cooperation: Cooperative  Orientation: Ox4  Gait and station stable.  "  Psychomotor: No psychomotor agitation/retardation, No EPS, No motor tics  Speech: normal rate, amount.  Mood \"a little better\"   Affect: congruent/appropriate.  Thought Content: goal directed, no delusional material present  Thought process: linear, organized.  Suicidality: No SI  Homicidality: No HI  Perception: No AH/VH. No overt psychosis.   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  Will tentatively plan on discharge on Monday     We discussed risks, benefits, and side effects of the above medication and the patient was agreeable with the plan. I have reviewed the treatment plan and agree with current plan.  Treatment was discussed with the patient who is agreeable to this treatment and plan.           "

## 2017-01-08 NOTE — PLAN OF CARE
Problem:  Patient Care Overview (Adult)  Goal: Plan of Care Review  Outcome: Ongoing (interventions implemented as appropriate)    01/08/17 0409   Coping/Psychosocial Response Interventions   Plan Of Care Reviewed With patient   Coping/Psychosocial   Patient Agreement with Plan of Care agrees   Patient Care Overview   Progress improving   Outcome Evaluation   Outcome Summary/Follow up Plan Pt will be discharged tomorrow. Pt has been calm and cooperative and ready to go. Pt denies SI and rates anxiety and depression 3/10.          Problem:  Overarching Goals  Goal: Adheres to Safety Considerations for Self and Others  Outcome: Ongoing (interventions implemented as appropriate)  Goal: Optimized Coping Skills in Response to Life Stressors  Outcome: Ongoing (interventions implemented as appropriate)  Goal: Develops/Participates in Therapeutic [place] to Support Successful Transition  Outcome: Ongoing (interventions implemented as appropriate)

## 2017-01-09 VITALS
WEIGHT: 176 LBS | DIASTOLIC BLOOD PRESSURE: 90 MMHG | SYSTOLIC BLOOD PRESSURE: 130 MMHG | HEIGHT: 65 IN | OXYGEN SATURATION: 96 % | BODY MASS INDEX: 29.32 KG/M2 | RESPIRATION RATE: 18 BRPM | HEART RATE: 94 BPM | TEMPERATURE: 98.1 F

## 2017-01-09 LAB — BACTERIA SPEC AEROBE CULT: NO GROWTH

## 2017-01-09 PROCEDURE — 99238 HOSP IP/OBS DSCHRG MGMT 30/<: CPT

## 2017-01-09 RX ORDER — HYDROXYZINE 50 MG/1
50 TABLET, FILM COATED ORAL EVERY 6 HOURS PRN
Qty: 90 TABLET | Refills: 0 | Status: SHIPPED | OUTPATIENT
Start: 2017-01-09

## 2017-01-09 RX ORDER — PHENYTOIN SODIUM 100 MG/1
100 CAPSULE, EXTENDED RELEASE ORAL 3 TIMES DAILY
Qty: 90 CAPSULE | Refills: 0 | Status: SHIPPED | OUTPATIENT
Start: 2017-01-09 | End: 2018-01-09

## 2017-01-09 RX ORDER — BUSPIRONE HYDROCHLORIDE 10 MG/1
10 TABLET ORAL 3 TIMES DAILY
Qty: 90 TABLET | Refills: 0 | Status: SHIPPED | OUTPATIENT
Start: 2017-01-09

## 2017-01-09 RX ADMIN — BUSPIRONE HYDROCHLORIDE 10 MG: 10 TABLET ORAL at 08:05

## 2017-01-09 RX ADMIN — PHENYTOIN 100 MG: 50 TABLET, CHEWABLE ORAL at 05:44

## 2017-01-09 RX ADMIN — NICOTINE 1 PATCH: 21 PATCH TRANSDERMAL at 08:05

## 2017-01-09 NOTE — SIGNIFICANT NOTE
01/09/17 1018   [place] Suicide Severity Rating Scale (Discharge)   1. Wish to be Dead Yes   2. Suicidal Thoughts Yes   3. Suicidal Thoughts with Method Without Specific Plan or Intent to Act No   4. Suicidal Intent Without Specific Plan No   5. Suicide Intent with Specific Plan Yes   6. Suicide Behavior Question No    by the day of discharge he was denying any suicidal ideation

## 2017-01-09 NOTE — PROGRESS NOTES
Patient is being discharged on this day. Patient is coded to [facility]. Trip released to [facility] and should arrive to transport patient to [facility] in [place].

## 2017-01-09 NOTE — DISCHARGE INSTR - LAB
Patient has declined outpatient follow up to be scheduled locally. Patient will be transported to [facility] [place] via [facility] and has been encouraged by Dr. Gutierrez and Therapist to seek outpatient treatment upon arriving in [place]. Please follow these recommendations to seek outpatient follow up once established in [place].

## 2017-01-09 NOTE — PROGRESS NOTES
D: Patient discharging today. Patient has denied local outpatient follow up and plans to be transported via [facility] to [facility] in [place] to travel to [place]. Patient has been encouraged by therapist and Dr. Gutierrez to seek outpatient treatment upon arrival in [place].

## 2017-01-09 NOTE — PLAN OF CARE
Problem: BH Patient Care Overview (Adult)  Goal: Plan of Care Review  Outcome: Ongoing (interventions implemented as appropriate)    01/09/17 0159   Coping/Psychosocial Response Interventions   Plan Of Care Reviewed With patient   Coping/Psychosocial   Patient Agreement with Plan of Care agrees   Patient Care Overview   Progress improving   Outcome Evaluation   Outcome Summary/Follow up Plan Reports feeling better, interacting well with staff and peers, no si/hi.

## 2017-01-09 NOTE — DISCHARGE SUMMARY
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 male hospitalized for depression with suicidal ideation with thoughts to overdose.  He was recently discharged from the psychiatric unit in [place] on 1/3/17.  Afterward he went to stay with his cousin and he said that they had an argument.  He said his cousin worries that he will kill himself and his children will find him so he doesn't want him living in his house, according to his report.  He was upset over this argument, and being newly homeless and felt overwhelmed.  We restarted his BuSpar and added Zoloft with Vistaril as needed.  Shortly after admission he began to problem solve, saying that he wanted to move forward with his life even if he couldn't live with his cousin.  At first he agreed to go to the [facility] homeless shelter.  He gets disability benefits and says he just got his check and has the entirety of it in his bank account, saying if he just had a place to stay for 1 month by the time he gets his check next month he would have enough to afford his own apartment.  After thinking it over he decided that he would [place] go down south to [place] where he had lived in the past.  He said he would [facility] for a month and follow through with the same plan to get his own apartment next month.  He was future oriented, pleasant, his mood and anxiety were improved and he was sleeping well.  He appeared safe for discharge with outpatient follow-up.  I strongly encouraged him to follow-up with outpatient mental health in [place].  Today he will go to the [facility] station in [place], [place] and then on to [place] per his report.    Procedures Performed       None    Consults:    Consults     No orders found from 12/7/2016 to 1/6/2017.          Pertinent Test Results:   Lab Results (last 7 days)     Procedure Component Value Units Date/Time    Phenytoin Level, Total [[id number]]  (Abnormal) Collected:  01/05/17 2048    Specimen:  Blood Updated:  01/05/17 2221     Phenytoin Level 4.9 (L) mcg/mL     CBC & Differential [[id number]] Collected:  01/06/17 0510    Specimen:  Blood Updated:  01/06/17 0526    Narrative:       The following orders were created for panel order CBC & Differential.  Procedure                               Abnormality         Status                     ---------                               -----------         ------                     CBC Auto Differential[[id number]]         Abnormal            Final result                 Please view results for these tests on the individual orders.    CBC Auto Differential [[id number]]  (Abnormal) Collected:  01/06/17 0510    Specimen:  Blood Updated:  01/06/17 0526     WBC 9.82 10*3/mm3      RBC 4.45 (L) 10*6/mm3      Hemoglobin 13.5 (L) g/dL      Hematocrit 42.2 %      MCV 94.8 (H) fL      MCH 30.3 pg      MCHC 32.0 (L) g/dL      RDW 13.4 %      RDW-SD 44.6 fl      MPV 11.1 (H) fL      Platelets 223 10*3/mm3      Neutrophil % 62.4 %      Lymphocyte % 24.0 %      Monocyte % 11.7 (H) %      Eosinophil % 1.2 %      Basophil % 0.3 %      Immature Grans % 0.4 %      Neutrophils, Absolute 6.12 10*3/mm3      Lymphocytes, Absolute 2.36 10*3/mm3      Monocytes, Absolute 1.15 (H) 10*3/mm3      Eosinophils, Absolute 0.12 10*3/mm3      Basophils, Absolute 0.03 10*3/mm3      Immature Grans, Absolute 0.04 (H) 10*3/mm3     Comprehensive Metabolic Panel [[id number]]  (Abnormal) Collected:  01/06/17 0510    Specimen:  Blood Updated:  01/06/17 0549     Glucose 93 mg/dL      BUN 11 mg/dL      Creatinine 0.89 mg/dL      Sodium 140 mmol/L      Potassium 4.1 mmol/L      Chloride 108 mmol/L      CO2 26.0 mmol/L      Calcium 9.2 mg/dL      Total Protein 7.6 g/dL       Albumin 4.20 g/dL      ALT (SGPT) 24 U/L      AST (SGOT) 35 (H) U/L      Alkaline Phosphatase 80 U/L      Total Bilirubin 0.3 mg/dL      eGFR Non African Amer 90 mL/min/1.73      Globulin 3.4 gm/dL      A/G Ratio 1.2 (L) g/dL      BUN/Creatinine Ratio 12.4      Anion Gap 6.0 mmol/L     Osmolality, Calculated [[id number]]  (Normal) Collected:  01/06/17 0510    Specimen:  Blood Updated:  01/06/17 0549     Osmolality Calc 278.5 mOsm/kg     Oxycodone, Urine [[id number]]  (Normal) Collected:  01/06/17 1546    Specimen:  Urine from Urine, Clean Catch Updated:  01/06/17 1617     Oxycodone Screen, Urine Negative     Narrative:       Oxycodone Screen Detects:    Oxycodone          100 ng/mL    Hydrocodone       1562 ng/mL    Codeine          13198 ng/mL    Dihydrocodeine   39071 ng/mL    Ethylmorphine    93440 ng/mL    Hydromorphone    06589 ng/mL    Oxymorphone      1562  ng/mL    Thebaine         10590 ng/mL    Urine Drug Screen [[id number]]  (Abnormal) Collected:  01/06/17 1546    Specimen:  Urine from Urine, Clean Catch Updated:  01/06/17 1625     Amphetamine Screen, Urine Negative      Barbiturates Screen, Urine Negative      Benzodiazepine Screen, Urine Negative      Cocaine Screen, Urine Negative      Methadone Screen, Urine Negative      Opiate Screen Negative      Phencyclidine (PCP), Urine Negative      Propoxyphene Screen Negative      THC, Screen, Urine Positive (A)     Narrative:       Negative Thresholds For Drugs Screened:                  Amphetamines              1000 ng/ml               Barbiturates               200 ng/ml               Benzodiazepines            200 ng/ml              Cocaine                    300 ng/ml              Methadone                  300 ng/ml              Opiates                    300 ng/ml               Phencyclidine               25 ng/ml               Propoxyphene               300 ng/ml              THC                         50 ng/ml    The reference range for all drugs  "tested is negative. This report includes final unconfirmed qualitative results to be used for medical treatment purposes only. Unconfirmed results must not be used for non-medical purposes such as employment or legal testing. Clinical consideration should be applied to any drug of abuse test, especially when unconfirmed quantitative results are used.      Urine Culture [[id number]]  (Normal) Collected:  01/06/17 1546    Specimen:  Urine from Urine, Clean Catch Updated:  01/09/17 0949     Urine Culture No growth           Mental Status Exam at Discharge:  Appearance: Neatly, casually dressed, good hygeine.   Cooperation: Cooperative  Orientation: Ox4  Gait and station stable.   Psychomotor: No psychomotor agitation/retardation, No EPS, No motor tics  Speech: normal rate, amount.  Mood \"better\"   Affect: congruent/appropriate.  Thought Content: goal directed, no delusional material present  Thought process: linear, organized.  Suicidality: No SI  Homicidality: No HI  Perception: No AH/VH. No overt psychosis.   Memory is intact for recent and remote events  Concentration: good  Impulse control: good  Insight: fair   Judgement: fair    Condition on Discharge:  stable    Vital Signs  Temp:  [97.9 °F (36.6 °C)-98.9 °F (37.2 °C)] 98.4 °F (36.9 °C)  Heart Rate:  [72-84] 72  Resp:  [18] 18  BP: (136-173)/(79-99) 136/79    Discharge Disposition  Home or Self Care    Discharge Medications   [facility]   Home Medication Instructions GUILLAUME:[id number]    Printed on:01/09/17 1018   Medication Information                      busPIRone (BUSPAR) 10 MG tablet  Take 1 tablet by mouth 3 (Three) Times a Day.             hydrOXYzine (ATARAX) 50 MG tablet  Take 1 tablet by mouth Every 6 (Six) Hours As Needed for anxiety.             phenytoin (DILANTIN) 100 MG ER capsule  Take 1 capsule by mouth 3 (Three) Times a Day.             sertraline (ZOLOFT) 50 MG tablet  Take 1 tablet by mouth Every Night.                 Discharge Diet:   Diet " Instructions     Advance Diet As Tolerated                     Activity at Discharge:   Activity Instructions     Activity as Tolerated                     Follow-up Appointments  No future appointments.      Test Results Pending at Discharge    None     ePña Gutierrez MD  01/09/17  10:18 AM

## 2018-10-30 ENCOUNTER — APPOINTMENT (OUTPATIENT)
Dept: CT IMAGING | Facility: HOSPITAL | Age: 53
End: 2018-10-30

## 2018-10-30 ENCOUNTER — HOSPITAL ENCOUNTER (EMERGENCY)
Facility: HOSPITAL | Age: 53
Discharge: HOME | End: 2018-10-30
Attending: EMERGENCY MEDICINE | Admitting: EMERGENCY MEDICINE

## 2018-10-30 VITALS
RESPIRATION RATE: 16 BRPM | HEART RATE: 81 BPM | BODY MASS INDEX: 29.99 KG/M2 | WEIGHT: 180 LBS | TEMPERATURE: 98.2 F | DIASTOLIC BLOOD PRESSURE: 79 MMHG | HEIGHT: 65 IN | SYSTOLIC BLOOD PRESSURE: 127 MMHG | OXYGEN SATURATION: 98 %

## 2018-10-30 DIAGNOSIS — R51.9 ACUTE NONINTRACTABLE HEADACHE, UNSPECIFIED HEADACHE TYPE: Primary | ICD-10-CM

## 2018-10-30 LAB
ALBUMIN SERPL-MCNC: 4.5 G/DL (ref 3.5–5)
ALBUMIN/GLOB SERPL: 1.4 G/DL (ref 1.5–2.5)
ALP SERPL-CCNC: 71 U/L (ref 40–129)
ALT SERPL W P-5'-P-CCNC: 19 U/L (ref 10–44)
ANION GAP SERPL CALCULATED.3IONS-SCNC: 5.6 MMOL/L (ref 3.6–11.2)
AST SERPL-CCNC: 30 U/L (ref 10–34)
BASOPHILS # BLD AUTO: 0.05 10*3/MM3 (ref 0–0.3)
BASOPHILS NFR BLD AUTO: 0.4 % (ref 0–2)
BILIRUB SERPL-MCNC: 0.6 MG/DL (ref 0.2–1.8)
BUN BLD-MCNC: 13 MG/DL (ref 7–21)
BUN/CREAT SERPL: 14.6 (ref 7–25)
CALCIUM SPEC-SCNC: 9.1 MG/DL (ref 7.7–10)
CHLORIDE SERPL-SCNC: 107 MMOL/L (ref 99–112)
CO2 SERPL-SCNC: 25.4 MMOL/L (ref 24.3–31.9)
CREAT BLD-MCNC: 0.89 MG/DL (ref 0.43–1.29)
DEPRECATED RDW RBC AUTO: 44.9 FL (ref 37–54)
EOSINOPHIL # BLD AUTO: 0.15 10*3/MM3 (ref 0–0.7)
EOSINOPHIL NFR BLD AUTO: 1.1 % (ref 0–5)
ERYTHROCYTE [DISTWIDTH] IN BLOOD BY AUTOMATED COUNT: 13.7 % (ref 11.5–14.5)
ETHANOL BLD-MCNC: <10 MG/DL
ETHANOL UR QL: <0.01 %
GFR SERPL CREATININE-BSD FRML MDRD: 90 ML/MIN/1.73
GLOBULIN UR ELPH-MCNC: 3.2 GM/DL
GLUCOSE BLD-MCNC: 101 MG/DL (ref 70–110)
HCT VFR BLD AUTO: 42.8 % (ref 42–52)
HGB BLD-MCNC: 14.2 G/DL (ref 14–18)
IMM GRANULOCYTES # BLD: 0.04 10*3/MM3 (ref 0–0.03)
IMM GRANULOCYTES NFR BLD: 0.3 % (ref 0–0.5)
LYMPHOCYTES # BLD AUTO: 2.31 10*3/MM3 (ref 1–3)
LYMPHOCYTES NFR BLD AUTO: 17.4 % (ref 21–51)
MCH RBC QN AUTO: 30.1 PG (ref 27–33)
MCHC RBC AUTO-ENTMCNC: 33.2 G/DL (ref 33–37)
MCV RBC AUTO: 90.7 FL (ref 80–94)
MONOCYTES # BLD AUTO: 1.09 10*3/MM3 (ref 0.1–0.9)
MONOCYTES NFR BLD AUTO: 8.2 % (ref 0–10)
NEUTROPHILS # BLD AUTO: 9.63 10*3/MM3 (ref 1.4–6.5)
NEUTROPHILS NFR BLD AUTO: 72.6 % (ref 30–70)
OSMOLALITY SERPL CALC.SUM OF ELEC: 275.9 MOSM/KG (ref 273–305)
PLATELET # BLD AUTO: 227 10*3/MM3 (ref 130–400)
PMV BLD AUTO: 11.3 FL (ref 6–10)
POTASSIUM BLD-SCNC: 3.9 MMOL/L (ref 3.5–5.3)
PROT SERPL-MCNC: 7.7 G/DL (ref 6–8)
RBC # BLD AUTO: 4.72 10*6/MM3 (ref 4.7–6.1)
SODIUM BLD-SCNC: 138 MMOL/L (ref 135–153)
WBC NRBC COR # BLD: 13.27 10*3/MM3 (ref 4.5–12.5)

## 2018-10-30 PROCEDURE — 99284 EMERGENCY DEPT VISIT MOD MDM: CPT

## 2018-10-30 PROCEDURE — 36415 COLL VENOUS BLD VENIPUNCTURE: CPT

## 2018-10-30 PROCEDURE — 70450 CT HEAD/BRAIN W/O DYE: CPT | Performed by: RADIOLOGY

## 2018-10-30 PROCEDURE — 70450 CT HEAD/BRAIN W/O DYE: CPT

## 2018-10-30 PROCEDURE — 80307 DRUG TEST PRSMV CHEM ANLYZR: CPT | Performed by: PHYSICIAN ASSISTANT

## 2018-10-30 PROCEDURE — 80053 COMPREHEN METABOLIC PANEL: CPT | Performed by: PHYSICIAN ASSISTANT

## 2018-10-30 PROCEDURE — 85025 COMPLETE CBC W/AUTO DIFF WBC: CPT | Performed by: PHYSICIAN ASSISTANT

## 2018-10-30 PROCEDURE — 96374 THER/PROPH/DIAG INJ IV PUSH: CPT

## 2018-10-30 PROCEDURE — 96361 HYDRATE IV INFUSION ADD-ON: CPT

## 2018-10-30 PROCEDURE — 25010000002 PROCHLORPERAZINE EDISYLATE PER 10 MG: Performed by: PHYSICIAN ASSISTANT

## 2018-10-30 RX ORDER — ACETAMINOPHEN 500 MG
TABLET ORAL
Status: DISPENSED
Start: 2018-10-30 | End: 2018-10-30

## 2018-10-30 RX ORDER — SODIUM CHLORIDE 0.9 % (FLUSH) 0.9 %
10 SYRINGE (ML) INJECTION AS NEEDED
Status: DISCONTINUED | OUTPATIENT
Start: 2018-10-30 | End: 2018-10-30 | Stop reason: HOSPADM

## 2018-10-30 RX ORDER — ACETAMINOPHEN 500 MG
1000 TABLET ORAL ONCE
Status: COMPLETED | OUTPATIENT
Start: 2018-10-30 | End: 2018-10-30

## 2018-10-30 RX ADMIN — PROCHLORPERAZINE EDISYLATE 10 MG: 5 INJECTION INTRAMUSCULAR; INTRAVENOUS at 03:21

## 2018-10-30 RX ADMIN — SODIUM CHLORIDE 1000 ML: 9 INJECTION, SOLUTION INTRAVENOUS at 02:53

## 2018-10-30 RX ADMIN — ACETAMINOPHEN 1000 MG: 500 TABLET, FILM COATED ORAL at 03:17
